# Patient Record
(demographics unavailable — no encounter records)

---

## 2025-06-09 NOTE — DISCUSSION/SUMMARY
[Normal Growth] : growth [Normal Development] : development [No Elimination Concerns] : elimination [None] : No known medical problems [No Feeding Concerns] : feeding [No Skin Concerns] : skin [Family Support] : family support [Normal Sleep Pattern] : sleep [Encouraging Literacy Activities] : encouraging literacy activities [Playing with Peers] : playing with peers [Promoting Physical Activity] : promoting physical activity [No Medications] : ~He/She~ is not on any medications [Safety] : safety [Parent/Guardian] : parent/guardian [FreeTextEntry1] : 3 year F here for well visit. No acute concerns for growth or development. discussed nutrition and development PVF sent, brush teeth, routine dental visits  RTO in 1 year for well visit or sooner PRN

## 2025-06-09 NOTE — HISTORY OF PRESENT ILLNESS
[FreeTextEntry1] : 4yo here fo rwell visit no concerns from dad   eating well, drinks milk 3x a day  normal urine/BM, toilet trained  sleeps well  brushing teeth, PVF   starting  in sept  speaking a lot, likes books, knows colors

## 2025-06-09 NOTE — PHYSICAL EXAM
[Alert] : alert [No Acute Distress] : no acute distress [Playful] : playful [Normocephalic] : normocephalic [PERRL] : PERRL [Conjunctivae with no discharge] : conjunctivae with no discharge [EOMI Bilateral] : EOMI bilateral [Auricles Well Formed] : auricles well formed [No Discharge] : no discharge [Clear Tympanic membranes with present light reflex and bony landmarks] : clear tympanic membranes with present light reflex and bony landmarks [Nares Patent] : nares patent [Palate Intact] : palate intact [Pink Nasal Mucosa] : pink nasal mucosa [Uvula Midline] : uvula midline [Nonerythematous Oropharynx] : nonerythematous oropharynx [Trachea Midline] : trachea midline [No Caries] : no caries [Supple, full passive range of motion] : supple, full passive range of motion [No Palpable Masses] : no palpable masses [Symmetric Chest Rise] : symmetric chest rise [Normoactive Precordium] : normoactive precordium [Clear to Auscultation Bilaterally] : clear to auscultation bilaterally [Regular Rate and Rhythm] : regular rate and rhythm [Normal S1, S2 present] : normal S1, S2 present [No Murmurs] : no murmurs [+2 Femoral Pulses] : +2 femoral pulses [Soft] : soft [NonTender] : non tender [Non Distended] : non distended [Normoactive Bowel Sounds] : normoactive bowel sounds [No Hepatomegaly] : no hepatomegaly [No Splenomegaly] : no splenomegaly [Johnny 1] : Johnny 1 [No Clitoromegaly] : no clitoromegaly [Normal Vagina Introitus] : normal vagina introitus [Patent] : patent [No Abnormal Lymph Nodes Palpated] : no abnormal lymph nodes palpated [Normally Placed] : normally placed [Symmetric Buttocks Creases] : symmetric buttocks creases [Symmetric Hip Rotation] : symmetric hip rotation [No Gait Asymmetry] : no gait asymmetry [Normal Muscle Tone] : normal muscle tone [No pain or deformities with palpation of bone, muscles, joints] : no pain or deformities with palpation of bone, muscles, joints [No Spinal Dimple] : no spinal dimple [NoTuft of Hair] : no tuft of hair [Straight] : straight [+2 Patella DTR] : +2 patella DTR [Cranial Nerves Grossly Intact] : cranial nerves grossly intact [No Rash or Lesions] : no rash or lesions